# Patient Record
Sex: FEMALE | Race: WHITE | NOT HISPANIC OR LATINO | URBAN - METROPOLITAN AREA
[De-identification: names, ages, dates, MRNs, and addresses within clinical notes are randomized per-mention and may not be internally consistent; named-entity substitution may affect disease eponyms.]

---

## 2021-04-08 DIAGNOSIS — Z23 ENCOUNTER FOR IMMUNIZATION: ICD-10-CM

## 2024-10-02 ENCOUNTER — OFFICE VISIT (OUTPATIENT)
Dept: URGENT CARE | Facility: CLINIC | Age: 54
End: 2024-10-02
Payer: COMMERCIAL

## 2024-10-02 ENCOUNTER — APPOINTMENT (OUTPATIENT)
Dept: RADIOLOGY | Facility: CLINIC | Age: 54
End: 2024-10-02
Payer: COMMERCIAL

## 2024-10-02 VITALS
TEMPERATURE: 97.5 F | BODY MASS INDEX: 45.82 KG/M2 | WEIGHT: 275 LBS | OXYGEN SATURATION: 96 % | DIASTOLIC BLOOD PRESSURE: 82 MMHG | HEART RATE: 79 BPM | RESPIRATION RATE: 16 BRPM | SYSTOLIC BLOOD PRESSURE: 134 MMHG | HEIGHT: 65 IN

## 2024-10-02 DIAGNOSIS — S63.641A SPRAIN OF METACARPOPHALANGEAL (MCP) JOINT OF RIGHT THUMB, INITIAL ENCOUNTER: Primary | ICD-10-CM

## 2024-10-02 DIAGNOSIS — M79.641 PAIN OF RIGHT HAND: ICD-10-CM

## 2024-10-02 PROCEDURE — 99203 OFFICE O/P NEW LOW 30 MIN: CPT

## 2024-10-02 PROCEDURE — 73130 X-RAY EXAM OF HAND: CPT

## 2024-10-02 RX ORDER — INSULIN ASPART 100 [IU]/ML
INJECTION, SOLUTION INTRAVENOUS; SUBCUTANEOUS
COMMUNITY
Start: 2024-07-15

## 2024-10-02 RX ORDER — ROSUVASTATIN CALCIUM 20 MG/1
20 TABLET, COATED ORAL DAILY
COMMUNITY

## 2024-10-02 RX ORDER — ANASTROZOLE 1 MG/1
1 TABLET ORAL DAILY
COMMUNITY
Start: 2024-02-12 | End: 2025-02-11

## 2024-10-02 RX ORDER — VALSARTAN 40 MG/1
40 TABLET ORAL DAILY
COMMUNITY
Start: 2024-07-12

## 2024-10-02 NOTE — PATIENT INSTRUCTIONS
Your x-rays were read by the provider. A radiologist will also read the x-rays and you will be notified of any abnormalities.     Wear the brace until you get xray results. If the final xray shows a fracture, keep the brace on until you follow-up with orthopedics. If the xray shows no fracture, you can use the brace as needed. Take off every 1-2 hours and perform gentle range of motion to prevent stiffness. You can also take it off to sleep and shower if there is no fracture.      Possible fracture in right 1st metacarpal bone. Follow-up with ortho - referral placed.    Continue elevation and ice applications.    Tylenol / Motrin for pain relief.    Go to the ED for any worsening symptoms.

## 2024-10-02 NOTE — PROGRESS NOTES
"  [facility]        NAME: Sandra Unger is a 54 y.o. female  : 1970    MRN: [id number]  DATE: 2024  TIME: 2:11 PM    Assessment and Plan   Sprain of metacarpophalangeal (MCP) joint of right thumb, initial encounter [S63.641A]  1. Sprain of metacarpophalangeal (MCP) joint of right thumb, initial encounter  Ambulatory Referral to Orthopedic Surgery      2. Pain of right hand  XR hand 3+ vw right        Possible 1st metacarpal fracture, pending rad read.  Patient fit for and placed in prefab thumb spica brace by RN.    Patient Instructions     Your x-rays were read by the provider. A radiologist will also read the x-rays and you will be notified of any abnormalities.     Wear the brace until you get xray results. If the final xray shows a fracture, keep the brace on until you follow-up with orthopedics. If the xray shows no fracture, you can use the brace as needed. Take off every 1-2 hours and perform gentle range of motion to prevent stiffness. You can also take it off to sleep and shower if there is no fracture.      Possible fracture in right 1st metacarpal bone. Follow-up with ortho - referral placed.    Continue elevation and ice applications.    Tylenol / Motrin for pain relief.    Go to the ED for any worsening symptoms.     If tests are performed, our office will contact you with results only if changes need to made to the care plan discussed with you at the visit. [facility]      Chief Complaint     Chief Complaint   Patient presents with    Finger Pain     Pt reports yesterday she was leaning on her right hand and she fell forward when her dog jumped near her. She reports leaning on the right thumb & \"heard a crunch\". C/o right thumb pain radiating into her wrist with movement. Applied ice/took ibuprofen.          History of Present Illness       54-year-old female who presents for evaluation of right thumb injury. Patient states she was " "leaning on her sofa with her right hand which got jammed into the sofa when her dog jumped on her. States she heard a \"crunch.\" Developed pain, swelling, and bruising which worsened today. Reports limited range of motion due to same. No numbness or tingling. Applied ice and took NSAID. Reports prior history of right thumb fracture from softball injury years ago.        Review of Systems   Review of Systems   Constitutional:  Negative for fever.   Respiratory:  Negative for shortness of breath.    Cardiovascular:  Negative for chest pain.   Gastrointestinal:  Negative for abdominal pain.   Musculoskeletal:  Positive for arthralgias (rght thumb / hand).   Skin:  Positive for color change (bruising). Negative for wound.   Neurological:  Negative for headaches.         Current Medications       Current Outpatient Medications:     anastrozole (ARIMIDEX) 1 mg tablet, Take 1 mg by mouth daily, Disp: , Rfl:     NovoLOG 100 UNIT/ML injection, INJECT 90 UNITS DAILY UNDER THE SKIN VIA INSULIN PUMP, Disp: , Rfl:     rosuvastatin (CRESTOR) 20 MG tablet, Take 20 mg by mouth daily, Disp: , Rfl:     valsartan (DIOVAN) 40 mg tablet, Take 40 mg by mouth daily, Disp: , Rfl:     Current Allergies     Allergies as of 10/02/2024 - Reviewed 10/02/2024   Allergen Reaction Noted    Codeine Anaphylaxis and Other (See Comments) 08/08/2005    Liraglutide Rash 08/15/2017            The following portions of the patient's history were reviewed and updated as appropriate: allergies, current medications, past family history, past medical history, past social history, past surgical history and problem list.     Past Medical History:   Diagnosis Date    Breast cancer (HCC)     DM (diabetes mellitus) (HCC)     High cholesterol     HTN (hypertension)        Past Surgical History:   Procedure Laterality Date    BREAST LUMPECTOMY Left     CHOLECYSTECTOMY      SMALL INTESTINE SURGERY      surgery as an infant    TONSILLECTOMY         History reviewed. No " "pertinent family history.      Medications have been verified.        Objective   /82   Pulse 79   Temp 97.5 °F (36.4 °C)   Resp 16   Ht 5' 5\" (1.651 m)   Wt 125 kg (275 lb)   SpO2 96%   BMI 45.76 kg/m²        Physical Exam     Physical Exam  Vitals and nursing note reviewed.   Constitutional:       General: She is not in acute distress.  HENT:      Head: Normocephalic and atraumatic.      Mouth/Throat:      Mouth: Mucous membranes are moist.   Cardiovascular:      Rate and Rhythm: Normal rate.   Pulmonary:      Effort: Pulmonary effort is normal.   Musculoskeletal:      Right forearm: Normal.      Right wrist: Normal. No swelling or tenderness. Normal range of motion. Normal pulse.      Cervical back: Normal range of motion and neck supple.      Comments: Swelling and ecchymosis to R thumb. +TTP of thumb into first metacarpal. Decreased flexion due to pain and swelling. Can otherwise make a complete fist. Sensation intact. Cap refill <2 sec.   Skin:     General: Skin is warm and dry.      Capillary Refill: Capillary refill takes less than 2 seconds.   Neurological:      Mental Status: She is alert and oriented to person, place, and time.                   "

## 2025-03-12 ENCOUNTER — TELEPHONE (OUTPATIENT)
Age: 55
End: 2025-03-12

## 2025-03-12 ENCOUNTER — PREP FOR PROCEDURE (OUTPATIENT)
Age: 55
End: 2025-03-12

## 2025-03-12 DIAGNOSIS — Z12.11 SCREENING FOR COLON CANCER: Primary | ICD-10-CM

## 2025-03-12 NOTE — TELEPHONE ENCOUNTER
"03/12/25  Screened by: Ken Soriano    Referring Provider Dr. Shoenberger    Pre- Screening:     There is no height or weight on file to calculate BMI.  H 5' 6\" Wt 264 lbs  BMI 42.6    Has patient been referred for a routine screening Colonoscopy? yes  Is the patient between 45-75 years old? yes      Previous Colonoscopy yes   If yes:    Date: 9 yrs ago     Facility:     Reason:       Does the patient want to see a Gastroenterologist prior to their procedure OR are they having any GI symptoms? no    Has the patient been hospitalized or had abdominal surgery in the past 6 months? no    Does the patient use supplemental oxygen? no    Does the patient take Coumadin, Lovenox, Plavix, Elliquis, Xarelto, or other blood thinning medication? no    Has the patient had a stroke, cardiac event, or stent placed in the past year? no    If patient is between 45yrs - 49yrs, please advise patient that we will have to confirm benefits & coverage with their insurance company for a routine screening colonoscopy.    "

## 2025-03-12 NOTE — TELEPHONE ENCOUNTER
Scheduled date of colonoscopy (as of today): 4/8/25  Physician performing colonoscopy: Dr. Tilley  Location of colonoscopy: [facility]  Bowel prep reviewed with patient: miralax/dulcolax prep, diabetic instructions, screening vs diagnostic sent via email   Instructions reviewed with patient by: N/A  Clearances: N/A    Yuliya from Dr. Shoenberger office called and scheduled pts colonoscopy.     Updated the insurance info to [employer]

## 2025-04-08 ENCOUNTER — HOSPITAL ENCOUNTER (OUTPATIENT)
Dept: GASTROENTEROLOGY | Facility: HOSPITAL | Age: 55
Setting detail: OUTPATIENT SURGERY
Discharge: HOME | End: 2025-04-08
Attending: INTERNAL MEDICINE
Payer: COMMERCIAL

## 2025-04-08 ENCOUNTER — ANESTHESIA (OUTPATIENT)
Dept: GASTROENTEROLOGY | Facility: HOSPITAL | Age: 55
End: 2025-04-08
Payer: COMMERCIAL

## 2025-04-08 ENCOUNTER — ANESTHESIA EVENT (OUTPATIENT)
Dept: GASTROENTEROLOGY | Facility: HOSPITAL | Age: 55
End: 2025-04-08
Payer: COMMERCIAL

## 2025-04-08 VITALS
OXYGEN SATURATION: 96 % | SYSTOLIC BLOOD PRESSURE: 122 MMHG | RESPIRATION RATE: 18 BRPM | DIASTOLIC BLOOD PRESSURE: 68 MMHG | TEMPERATURE: 97.3 F | HEART RATE: 73 BPM

## 2025-04-08 DIAGNOSIS — Z12.11 SCREENING FOR COLON CANCER: ICD-10-CM

## 2025-04-08 DIAGNOSIS — Z86.0100 PERSONAL HISTORY OF COLON POLYPS, UNSPECIFIED: ICD-10-CM

## 2025-04-08 PROCEDURE — G0121 COLON CA SCRN NOT HI RSK IND: HCPCS | Performed by: INTERNAL MEDICINE

## 2025-04-08 RX ORDER — CETIRIZINE HYDROCHLORIDE 5 MG/1
5 TABLET, CHEWABLE ORAL DAILY
COMMUNITY

## 2025-04-08 RX ORDER — PROPOFOL 10 MG/ML
INJECTION, EMULSION INTRAVENOUS AS NEEDED
Status: DISCONTINUED | OUTPATIENT
Start: 2025-04-08 | End: 2025-04-08

## 2025-04-08 RX ORDER — SODIUM CHLORIDE 9 MG/ML
100 INJECTION, SOLUTION INTRAVENOUS CONTINUOUS
Status: CANCELLED | OUTPATIENT
Start: 2025-04-08

## 2025-04-08 RX ORDER — KETAMINE HCL IN NACL, ISO-OSM 100MG/10ML
SYRINGE (ML) INJECTION AS NEEDED
Status: DISCONTINUED | OUTPATIENT
Start: 2025-04-08 | End: 2025-04-08

## 2025-04-08 RX ORDER — LIDOCAINE HYDROCHLORIDE 20 MG/ML
INJECTION, SOLUTION EPIDURAL; INFILTRATION; INTRACAUDAL; PERINEURAL AS NEEDED
Status: DISCONTINUED | OUTPATIENT
Start: 2025-04-08 | End: 2025-04-08

## 2025-04-08 RX ORDER — SODIUM CHLORIDE 9 MG/ML
INJECTION, SOLUTION INTRAVENOUS CONTINUOUS PRN
Status: DISCONTINUED | OUTPATIENT
Start: 2025-04-08 | End: 2025-04-08

## 2025-04-08 RX ADMIN — PROPOFOL 25 MG: 10 INJECTION, EMULSION INTRAVENOUS at 12:21

## 2025-04-08 RX ADMIN — PROPOFOL 25 MG: 10 INJECTION, EMULSION INTRAVENOUS at 12:30

## 2025-04-08 RX ADMIN — PROPOFOL 25 MG: 10 INJECTION, EMULSION INTRAVENOUS at 12:26

## 2025-04-08 RX ADMIN — PROPOFOL 75 MG: 10 INJECTION, EMULSION INTRAVENOUS at 12:19

## 2025-04-08 RX ADMIN — PROPOFOL 25 MG: 10 INJECTION, EMULSION INTRAVENOUS at 12:29

## 2025-04-08 RX ADMIN — PROPOFOL 25 MG: 10 INJECTION, EMULSION INTRAVENOUS at 12:27

## 2025-04-08 RX ADMIN — SODIUM CHLORIDE: 0.9 INJECTION, SOLUTION INTRAVENOUS at 12:08

## 2025-04-08 RX ADMIN — LIDOCAINE HYDROCHLORIDE 100 MG: 20 INJECTION, SOLUTION EPIDURAL; INFILTRATION; INTRACAUDAL; PERINEURAL at 12:19

## 2025-04-08 RX ADMIN — Medication 15 MG: at 12:19

## 2025-04-08 RX ADMIN — PROPOFOL 25 MG: 10 INJECTION, EMULSION INTRAVENOUS at 12:25

## 2025-04-08 RX ADMIN — PROPOFOL 25 MG: 10 INJECTION, EMULSION INTRAVENOUS at 12:23

## 2025-04-08 NOTE — ANESTHESIA PREPROCEDURE EVALUATION
Procedure:  COLONOSCOPY    Relevant Problems   ANESTHESIA (within normal limits)      CARDIO  Normal echo 2021   (+) High cholesterol   (+) Hypertension      GYN   (+) Breast cancer (HCC)      NEURO/PSYCH   (+) Depression      PULMONARY  Nonsmoker    (-) Sleep apnea   (-) URI (upper respiratory infection)      Endocrine   (+) DM (diabetes mellitus) (HCC) (BG monitor/insulin pump in situ  )    BMI 45    Physical Exam    Airway    Mallampati score: I  TM Distance: >3 FB  Neck ROM: full     Dental   No notable dental hx     Cardiovascular      Pulmonary      Other Findings  post-pubertal.    Lab Results   Component Value Date    SODIUM 139 12/02/2024    K 4.8 12/02/2024    BUN 9 12/02/2024    CREATININE 0.63 12/02/2024    EGFR 104 12/02/2024     Lab Results   Component Value Date    HGBA1C 9.2 (H) 11/20/2024     Anesthesia Plan  ASA Score- 2     Anesthesia Type- IV sedation with anesthesia with ASA Monitors.         Additional Monitors:     Airway Plan:            Plan Factors-Exercise tolerance (METS): >4 METS.    Chart reviewed.   Existing labs reviewed. Patient summary reviewed.    Patient is not a current smoker.              Induction- intravenous.    Postoperative Plan-         Informed Consent- Anesthetic plan and risks discussed with patient.  I personally reviewed this patient with the CRNA. Discussed and agreed on the Anesthesia Plan with the CRNA..      NPO Status:  Vitals Value Taken Time   Date of last liquid 04/08/25 04/08/25 1108   Time of last liquid 0630 04/08/25 1108   Date of last solid 04/06/25 04/08/25 1108   Time of last solid 2200 04/08/25 1108

## 2025-04-08 NOTE — H&P
History and Physical - [facility]  Sandra Unger 54 y.o. female MRN: [id number]    HPI: Sandra Unger is a 54 y.o. year old female who presents for colonoscopy secondary to personal history of polyps    REVIEW OF SYSTEMS: Per the HPI, and otherwise unremarkable.    Historical Information   Past Medical History:   Diagnosis Date    Breast cancer (HCC)     Depression     Diabetes mellitus (HCC)     DM (diabetes mellitus) (HCC)     High cholesterol     HTN (hypertension)     Hypertension      Past Surgical History:   Procedure Laterality Date    BREAST LUMPECTOMY Left     CHOLECYSTECTOMY      SMALL INTESTINE SURGERY      surgery as an infant    TONSILLECTOMY      US BREAST CLIP NEEDLE LOC LEFT Left 2024     Social History   Social History     Substance and Sexual Activity   Alcohol Use None     Social History     Substance and Sexual Activity   Drug Use Never     Social History     Tobacco Use   Smoking Status Never   Smokeless Tobacco Never     Family History   Problem Relation Age of Onset    Cancer Mother     Cancer Father        Meds/Allergies       Current Outpatient Medications:     anastrozole (ARIMIDEX) 1 mg tablet    cetirizine (ZyrTEC) 5 MG chewable tablet    rosuvastatin (CRESTOR) 20 MG tablet    valsartan (DIOVAN) 40 mg tablet    NovoLOG 100 UNIT/ML injection    Allergies   Allergen Reactions    Codeine Anaphylaxis and Other (See Comments)     ANAPHYLAXIS \T\ ANGIOEDEMA moshe perc-itchy    Liraglutide Rash       Objective     /67   Pulse 90   Temp (!) 97.3 °F (36.3 °C) (Temporal)   Resp 20   SpO2 95%     PHYSICAL EXAM    Gen: NAD AAOx3  Head: Normocephalic, Atraumatic  CV: S1S2 RRR no m/r/g  CHEST: Clear b/l no c/r/w  ABD: soft, +BS NT/ND  EXT: no edema    ASSESSMENT/PLAN:  This is a 54 y.o. year old female here for colonoscopy secondary to personal history of polyps, and she is stable and optimized for her procedure.

## 2025-04-08 NOTE — ANESTHESIA POSTPROCEDURE EVALUATION
----- Message from Sarah Vergara PA-C sent at 3/8/2021 10:29 AM CST -----  Please inform patient of CT scan result. No specific finding of diverticulitis. There is mild colonic diverticulosis. She should be taking MiraLax once daily. Has pain improved? Post-Op Assessment Note    CV Status:  Stable    Pain management: adequate       Mental Status:  Lethargic and sleepy   Hydration Status:  Stable   PONV Controlled:  None   Airway Patency:  Patent     Post Op Vitals Reviewed: Yes    No anethesia notable event occurred.    Staff: CRNA           Last Filed PACU Vitals:  Vitals Value Taken Time   Temp     Pulse     BP     Resp     SpO2

## 2025-05-19 ENCOUNTER — OFFICE VISIT (OUTPATIENT)
Dept: URGENT CARE | Facility: CLINIC | Age: 55
End: 2025-05-19
Payer: COMMERCIAL

## 2025-05-19 ENCOUNTER — APPOINTMENT (OUTPATIENT)
Dept: RADIOLOGY | Facility: CLINIC | Age: 55
End: 2025-05-19
Payer: COMMERCIAL

## 2025-05-19 VITALS — HEART RATE: 86 BPM | TEMPERATURE: 98.7 F | RESPIRATION RATE: 18 BRPM | OXYGEN SATURATION: 99 %

## 2025-05-19 DIAGNOSIS — M25.561 ACUTE PAIN OF RIGHT KNEE: Primary | ICD-10-CM

## 2025-05-19 DIAGNOSIS — M25.561 ACUTE PAIN OF RIGHT KNEE: ICD-10-CM

## 2025-05-19 PROCEDURE — 99213 OFFICE O/P EST LOW 20 MIN: CPT

## 2025-05-19 PROCEDURE — 73562 X-RAY EXAM OF KNEE 3: CPT

## 2025-05-19 NOTE — PATIENT INSTRUCTIONS
Patient Instructions   The final xray result will appear in your mychart;  Use Voltaren gel as prescribed  Ice as needed.  Acetaminophen and/or ibuprofen for pain and inflammation.  Follow-up with orthopedics.  PCP follow-up in 3-5 days  Proceed to the ER if symptoms worsen.      If tests have been performed at [facility], our office will contact you with results if changes need to be made to the care plan discussed with you at the visit.  You can review your full results on [facility].

## 2025-05-19 NOTE — PROGRESS NOTES
[facility]        NAME: Sandra Unger is a 54 y.o. female  : 1970    MRN: [id number]  DATE: May 19, 2025  TIME: 7:22 PM    Assessment and Plan   Acute pain of right knee [M25.561]  1. Acute pain of right knee  XR knee 3 vw right non injury    Diclofenac Sodium (VOLTAREN) 1 %    Ambulatory Referral to Orthopedic Surgery            Patient Instructions   The final xray result will appear in your mychart;  Use Voltaren gel as prescribed  Ice as needed.  Acetaminophen and/or ibuprofen for pain and inflammation.  Follow-up with orthopedics.  PCP follow-up in 3-5 days  Proceed to the ER if symptoms worsen.      If tests have been performed at [facility], our office will contact you with results if changes need to be made to the care plan discussed with you at the visit.  You can review your full results on [facility].    Chief Complaint     Chief Complaint   Patient presents with    Knee Pain     Started 3 days ago with right knee pain, denies specific injury, reports pain was worse today while walking on an incline, reports intermittent radiating pain down leg         History of Present Illness       55 y/o F with PMHx of HTN and DM, presents for right knee pain x 3 days.  Patient notes she developed atraumatic knee pain at that time.  Today patient was walking up a incline, when she felt a pop in the back of her knee.  Has been using Advil without relief.  Also use crutches        Review of Systems   Review of Systems   Constitutional:  Negative for chills and fever.   Musculoskeletal:  Positive for gait problem. Negative for joint swelling.   Skin:  Negative for color change.         Current Medications     Current Medications[1]    Current Allergies     Allergies as of 2025 - Reviewed 2025   Allergen Reaction Noted    Codeine Anaphylaxis and Other (See Comments) 2005    Liraglutide Rash 08/15/2017            The following portions of the patient's history were reviewed and  updated as appropriate: allergies, current medications, past family history, past medical history, past social history, past surgical history and problem list.     Past Medical History:   Diagnosis Date    Breast cancer (HCC)     Depression     Diabetes mellitus (HCC)     DM (diabetes mellitus) (HCC)     High cholesterol     HTN (hypertension)     Hypertension        Past Surgical History:   Procedure Laterality Date    BREAST LUMPECTOMY Left     CHOLECYSTECTOMY      SMALL INTESTINE SURGERY      surgery as an infant    TONSILLECTOMY      US BREAST CLIP NEEDLE LOC LEFT Left 2024       Family History   Problem Relation Age of Onset    Cancer Mother     Cancer Father          Medications have been verified.        Objective   Pulse 86   Temp 98.7 °F (37.1 °C) (Tympanic)   Resp 18   SpO2 99%   No LMP recorded. Patient is postmenopausal.       Physical Exam     Physical Exam  Vitals and nursing note reviewed.   Constitutional:       General: She is not in acute distress.     Appearance: She is not toxic-appearing.   HENT:      Head: Normocephalic and atraumatic.     Eyes:      Conjunctiva/sclera: Conjunctivae normal.     Pulmonary:      Effort: Pulmonary effort is normal.     Musculoskeletal:      Comments: Tenderness palpation on posterior lateral aspect of calf  Range of motion of ankle, knee within normal limits, with subjective discomfort with plantarflexion and full extension     Neurological:      Mental Status: She is alert.     Psychiatric:         Mood and Affect: Mood normal.         Behavior: Behavior normal.                        [1]   Current Outpatient Medications:     Diclofenac Sodium (VOLTAREN) 1 %, Apply 2 g topically 4 (four) times a day, Disp: 100 g, Rfl: 0    anastrozole (ARIMIDEX) 1 mg tablet, Take 1 mg by mouth daily, Disp: , Rfl:     cetirizine (ZyrTEC) 5 MG chewable tablet, Chew 5 mg daily, Disp: , Rfl:     NovoLOG 100 UNIT/ML injection, INJECT 90 UNITS DAILY UNDER THE SKIN VIA INSULIN PUMP,  Disp: , Rfl:     rosuvastatin (CRESTOR) 20 MG tablet, Take 20 mg by mouth daily, Disp: , Rfl:     valsartan (DIOVAN) 40 mg tablet, Take 40 mg by mouth daily, Disp: , Rfl:

## 2025-06-05 ENCOUNTER — OFFICE VISIT (OUTPATIENT)
Dept: OBGYN CLINIC | Facility: CLINIC | Age: 55
End: 2025-06-05
Payer: COMMERCIAL

## 2025-06-05 VITALS — HEIGHT: 65 IN | BODY MASS INDEX: 44.65 KG/M2 | WEIGHT: 268 LBS

## 2025-06-05 DIAGNOSIS — M17.11 PRIMARY OSTEOARTHRITIS OF RIGHT KNEE: Primary | ICD-10-CM

## 2025-06-05 PROCEDURE — 99203 OFFICE O/P NEW LOW 30 MIN: CPT | Performed by: STUDENT IN AN ORGANIZED HEALTH CARE EDUCATION/TRAINING PROGRAM

## 2025-06-05 RX ORDER — IBUPROFEN 200 MG
600 TABLET ORAL EVERY 6 HOURS PRN
COMMUNITY

## 2025-06-05 RX ORDER — CELECOXIB 100 MG/1
100 CAPSULE ORAL 2 TIMES DAILY
Qty: 60 CAPSULE | Refills: 1 | Status: SHIPPED | OUTPATIENT
Start: 2025-06-05

## 2025-06-05 NOTE — PROGRESS NOTES
ORTHO CARE [facility] ORTHOPEDIC CARE SPECIALISTS [place]  [address]  [facility] [address]  [facility] [address] [phone]       Sandra Unger  [id number]  1970    ORTHOPAEDIC SURGERY OUTPATIENT NOTE  6/5/2025    Assessment & Plan  Primary osteoarthritis of right knee  X-rays reviewed with patient at today's visit.  I explained to her that the pain she is experiencing is most likely result of osteoarthritis.  I believe as a result of this osteoarthritis, she is developing patellofemoral pain.  I discussed nonsurgical interventions such as injection therapy, bracing, and physical therapy.  Patient wishes to proceed with a short hinged knee brace as well as physical therapy.  All the orders were placed at today's visit.  In the event that these interventions do not provide any relief, I also placed an order for a Durolane Visco injection that she can schedule at a later date.  I also provided her a prescription of Celebrex 100 mg to take twice daily with food.  I remind her that she should not take any other anti-inflammatory medication with this.  Tylenol is safe.  No formal restrictions for activities, but use pain as a guide.    Orders:    Ambulatory Referral to Physical Therapy; Future    Durable Medical Equipment    celecoxib (CeleBREX) 100 mg capsule; Take 1 capsule (100 mg total) by mouth 2 (two) times a day    Injection Procedure Prior Authorization; Future        Follow-up:  Return if symptoms worsen or fail to improve.    The patient's diagnosis and treatment were discussed at length today. We discussed no treatment, non-operative treatment, and operative treatment.    Procedures  No procedures today.      HISTORY:  55 y.o. female  whose occupation is unknown, referred to me by urgent care, seen in clinic for consultation of right knee pain.  She states that on 5/19/2025 she was hiking and felt a sharp pain in the back of her knee with swelling.  She states that she then went to an  "urgent care where she obtained an x-ray that demonstrated tricompartmental osteoarthritis.  Since then, she states that the swelling has gone down, as well as no pain in the back the knee.  She denies any clicking or catching in the knee.  She does note some instances of instability.  She states pain is increased with steps and prolonged activity.  She states that she currently takes 600 mg of Advil at night.  She denies any numbness or tingling at this time.    Surgical History:  None    Previous Injection(s): none      The following portions of the patient's history were reviewed and updated as appropriate: allergies, current medications, past family history, past social history, past surgical history and problem list.    Ht 5' 5\" (1.651 m)   Wt 122 kg (268 lb)   BMI 44.60 kg/m²    Lab Results   Component Value Date    HGBA1C 9.2 (H) 11/20/2024         Past Medical History[1]    Past Surgical History[2]    Social History     Socioeconomic History    Marital status: /Civil Union     Spouse name: Not on file    Number of children: Not on file    Years of education: Not on file    Highest education level: Not on file   Occupational History    Not on file   Tobacco Use    Smoking status: Never    Smokeless tobacco: Never   Substance and Sexual Activity    Alcohol use: Not on file    Drug use: Never    Sexual activity: Yes     Partners: Male   Other Topics Concern    Not on file   Social History Narrative    Not on file     Social Drivers of Health     Financial Resource Strain: Not At Risk (5/8/2025)    Received from [facility]    Financial Insecurity     In the last 12 months did you skip medications to save money?: No     In the last 12 months was there a time when you needed to see a doctor but could not because of cost?: No   Food Insecurity: No Food Insecurity (5/8/2025)    Received from [facility]    Food Insecurity     In the last 12 months did you ever eat less than " you felt you should because there wasn't enough money for food?: No   Transportation Needs: No Transportation Needs (5/8/2025)    Received from [facility]    Transportation Needs     In the last 12 months have you ever had to go without healthcare because you didn't have a way to get there?: No   Physical Activity: Not on file   Stress: Stress Concern Present (6/14/2023)    Received from [facility]    [employer] - Occupational Stress Questionnaire     Feeling of Stress : Very much   Social Connections: Socially Integrated (5/8/2025)    Received from [facility]    Social Connection     Do you often feel lonely?: No   Intimate Partner Violence: Not At Risk (11/20/2024)    Received from [facility]    Humiliation, Afraid, Rape, and Kick questionnaire     Within the last year, have you been afraid of your partner or ex-partner?: No     Within the last year, have you been humiliated or emotionally abused in other ways by your partner or ex-partner?: No     Within the last year, have you been kicked, hit, slapped, or otherwise physically hurt by your partner or ex-partner?: No     Within the last year, have you been raped or forced to have any kind of sexual activity by your partner or ex-partner?: No   Housing Stability: Not At Risk (5/8/2025)    Received from [facility]    Housing Stability     Are you worried that in the next 2 months you may not have stable housing?: No       Family History[3]     Patient's Medications   New Prescriptions    CELECOXIB (CELEBREX) 100 MG CAPSULE    Take 1 capsule (100 mg total) by mouth 2 (two) times a day   Previous Medications    ANASTROZOLE (ARIMIDEX) 1 MG TABLET    Take 1 mg by mouth daily    CETIRIZINE (ZYRTEC) 5 MG CHEWABLE TABLET    Chew 5 mg in the morning.    DICLOFENAC SODIUM (VOLTAREN) 1 %    Apply 2 g topically 4 (four) times a day    IBUPROFEN (MOTRIN) 200  MG TABLET    Take 600 mg by mouth every 6 (six) hours as needed for mild pain    NOVOLOG 100 UNIT/ML INJECTION        ROSUVASTATIN (CRESTOR) 20 MG TABLET    Take 20 mg by mouth in the morning.    VALSARTAN (DIOVAN) 40 MG TABLET    Take 40 mg by mouth in the morning.   Modified Medications    No medications on file   Discontinued Medications    No medications on file       Allergies[4]       REVIEW OF SYSTEMS:  Constitutional: Negative.    HEENT: Negative.    Respiratory: Negative.    Skin: Negative.    Neurological: Negative.    Psychiatric/Behavioral: Negative.  Musculoskeletal: Negative except for that mentioned in the HPI.    Gen: No acute distress, resting comfortably in bed  HEENT: Eyes clear, moist mucus membranes, hearing intact  Respiratory: No audible wheezing or stridor  Cardiovascular: Well Perfused peripherally, 2+ distal pulse  Abdomen: nondistended, no peritoneal signs     PHYSICAL EXAM:      Right Knee Exam  Alignment:  Normal knee alignment.  Inspection:  No swelling. No edema. No erythema. No ecchymosis.  Palpation:  No tenderness.  ROM:  Knee Extension 0. Knee Flexion 120.  Strength:  5/5 quadriceps and hamstrings.  Stability:  No objective knee instability. Stable Varus / Valgus stress, Lachman, and Posterior drawer.  Tests:  (-) Himanshu.  Patella:  Patella tracks centrally with crepitus.  Neurovascular:  Sensation intact in DP/SP/Renae/Sa/T nerve distributions. Sensation intact L1-S1 BLE.  2+ DP & PT pulses.  Gait:  Smooth.    IMAGING:  I have personally reviewed pertinent films in PACS and/or [facility].  XR of right knee - mild to moderate tricompartmental degenerative changes to the knee, worse in the medial compartment with joint space narrowing and sclerotic changes.  No acute fractures or abnormalities noted.. I have reviewed the radiology report(s) and agree with their impression.      Electronic Medical Records were reviewed including Roshan Sr PA-C from urgent care    Rojelio  "Esther    Scribe Attestation      I,:  Rojelio Santana am acting as a scribe while in the presence of the attending physician.:       I,:  Alan Worthington, DO personally performed the services described in this documentation    as scribed in my presence.:               Portions of the record may have been created with voice recognition software.  Occasional wrong word or \"sound a like\" substitutions may have occurred due to the inherent limitations of voice recognition software.  Read the chart carefully and recognize, using context, where substitutions have occurred. All patient's questions were answered to their satisfaction.          [1]   Past Medical History:  Diagnosis Date    Breast cancer (HCC)     Depression     Diabetes mellitus (HCC)     DM (diabetes mellitus) (HCC)     High cholesterol     HTN (hypertension)     Hypertension    [2]   Past Surgical History:  Procedure Laterality Date    BREAST LUMPECTOMY Left     CHOLECYSTECTOMY      SMALL INTESTINE SURGERY      surgery as an infant    TONSILLECTOMY      US BREAST CLIP NEEDLE LOC LEFT Left 2024   [3]   Family History  Problem Relation Name Age of Onset    Cancer Mother Sandra Saldana     Cancer Father Coleman    [4]   Allergies  Allergen Reactions    Codeine Anaphylaxis and Other (See Comments)     ANAPHYLAXIS \T\ ANGIOEDEMA moshe perc-itchy    Liraglutide Rash     "

## 2025-06-05 NOTE — ASSESSMENT & PLAN NOTE
X-rays reviewed with patient at today's visit.  I explained to her that the pain she is experiencing is most likely result of osteoarthritis.  I believe as a result of this osteoarthritis, she is developing patellofemoral pain.  I discussed nonsurgical interventions such as injection therapy, bracing, and physical therapy.  Patient wishes to proceed with a short hinged knee brace as well as physical therapy.  All the orders were placed at today's visit.  In the event that these interventions do not provide any relief, I also placed an order for a Durolane Visco injection that she can schedule at a later date.  I also provided her a prescription of Celebrex 100 mg to take twice daily with food.  I remind her that she should not take any other anti-inflammatory medication with this.  Tylenol is safe.  No formal restrictions for activities, but use pain as a guide.    Orders:    Ambulatory Referral to Physical Therapy; Future    Durable Medical Equipment    celecoxib (CeleBREX) 100 mg capsule; Take 1 capsule (100 mg total) by mouth 2 (two) times a day    Injection Procedure Prior Authorization; Future

## 2025-06-12 ENCOUNTER — PROCEDURE VISIT (OUTPATIENT)
Dept: OBGYN CLINIC | Facility: CLINIC | Age: 55
End: 2025-06-12
Payer: COMMERCIAL

## 2025-06-12 VITALS — WEIGHT: 268 LBS | HEIGHT: 65 IN | BODY MASS INDEX: 44.65 KG/M2

## 2025-06-12 DIAGNOSIS — M17.11 PRIMARY OSTEOARTHRITIS OF RIGHT KNEE: Primary | ICD-10-CM

## 2025-06-12 PROCEDURE — 20610 DRAIN/INJ JOINT/BURSA W/O US: CPT

## 2025-06-12 NOTE — ASSESSMENT & PLAN NOTE
Patient is present for visco injection which was given without complication and was well tolerated  Discussed that she should avoid strenuous lower body exercises for first couple days   OTC pan medication as needed  Discussed that injection can be repeated every 6 months  Follow up as needed

## 2025-06-12 NOTE — PROGRESS NOTES
ORTHO CARE [facility] ORTHOPEDIC CARE SPECIALISTS [place]  [address]  [facility] [address]  [place] [address]  [phone]       Sandra Unger  [id number]  1970    ORTHOPAEDIC SURGERY OUTPATIENT NOTE  6/12/2025  :  Assessment & Plan  Primary osteoarthritis of right knee  Patient is present for visco injection which was given without complication and was well tolerated  Discussed that she should avoid strenuous lower body exercises for first couple days   OTC pan medication as needed  Discussed that injection can be repeated every 6 months  Follow up as needed        Large joint arthrocentesis: R knee    Performed by: Yuliya Pollard PA-C  Authorized by: Alan Worthington DO    Universal Protocol:  Consent: Verbal consent obtained  Risks and benefits: risks, benefits and alternatives were discussed  Consent given by: patient  Timeout called at: 6/12/2025 11:34 AM.  Patient understanding: patient states understanding of the procedure being performed  Site marked: the operative site was marked  Patient identity confirmed: verbally with patient  Supporting Documentation  Indications: pain     Is this a Visco injection? Yes  Non-Pharmacologic Treatments Attempted: Physical Therapy  Pharmacologic Treatments Attempted: Otc pain meds  Pain Score: 0Procedure Details  Location: knee - R knee  Needle size: 20 G  Ultrasound guidance: no  Approach: anterolateral  Medications administered: 48 mg hylan 48 MG/6ML    Patient tolerance: patient tolerated the procedure well with no immediate complications  Dressing:  Sterile dressing applied            Patient is here for her right knee Synvisc One Injection.     Patient rates their pain as 4/10 today along the diffuse.    Pain is worse with going up and down stairs and squatting    Physical exam of the knee shows no effusion no ecchymosis.    Patient has attempted physical therapy and cortisone outpatient with minimal improvement of their symptoms.    Patient  tolerated the procedure well and all questions concerns were answered.

## 2025-06-26 ENCOUNTER — EVALUATION (OUTPATIENT)
Dept: PHYSICAL THERAPY | Facility: CLINIC | Age: 55
End: 2025-06-26
Attending: STUDENT IN AN ORGANIZED HEALTH CARE EDUCATION/TRAINING PROGRAM
Payer: COMMERCIAL

## 2025-06-26 DIAGNOSIS — M17.11 PRIMARY OSTEOARTHRITIS OF RIGHT KNEE: Primary | ICD-10-CM

## 2025-06-26 PROCEDURE — 97161 PT EVAL LOW COMPLEX 20 MIN: CPT | Performed by: PHYSICAL THERAPIST

## 2025-06-26 NOTE — PROGRESS NOTES
"PT Evaluation     Today's date: 2025  Patient name: Sandra Unger  : 1970  MRN: [id number]  Referring provider: Alan Worthington DO  Dx:   Encounter Diagnosis     ICD-10-CM    1. Primary osteoarthritis of right knee  M17.11 Ambulatory Referral to Physical Therapy                     Assessment    Assessment details: Pt is a 55 y.o. female who presents to outpatient PT with pain, decreased rom, decreased strength and decreased functional mobility consistent with knee OA. She will benefit from skilled PT to address these deficits in order to achieve her goals and maximize her functional mobility.           Goals  Short Term Goals:   Independent performance of initial hep  Decrease pain 2 points on VAS      Long Term Goals:  Independent performance of comprehensive hep  Work performance is returned to max level of function  Performance of IADL's is returned to max level of function  Performance in recreational activities is improved to max level of function  Able to walk community distance with min pain  Able to ascend basements stairs with reciprocal gait pattern and single rail.    Plan    Planned therapy interventions: joint mobilization, manual therapy, massage, strengthening, stretching, therapeutic activities, therapeutic exercise, therapeutic training, home exercise program and IADL retraining    Frequency: 2x week        Subjective Evaluation    History of Present Illness  Mechanism of injury: Pt reports that she has been experiencing off an on R knee for 6 months but has been gradually getting worse. Underwent an injection on 25 with good pain reduction but reports that posterior knee pain returned yesterday when she was walking her dog.  X-ray revealed OA per patient reports.  Reports occasional episodes of instability especially when walking up her basement stairs, requires a railing \"to pull myself up\".  Reports that she will occasionally notice edema.  Reports that she would like to " return to walking her young dog like she used.    She was Rx anti-inflammatories and night to help her sleep and to reduce morning pain.     Hx of L plantar fasciitis.     Patient Goals  Patient goals for therapy: decreased edema, decreased pain, increased motion, increased strength, independence with ADLs/IADLs and return to sport/leisure activities    Pain  Current pain ratin  At best pain ratin  At worst pain ratin      Objective  R knee      ROM   Flexion: 105 pain at end range   Extension: -8 pain at end range    Strength:   Flexion:  4/5   Extension:  4/5    TTP anterior and medial anterior line and over VMO  Crepitus noted with arom  Patella misael  Antalgic gait pattern including decreased stance time on L and inability to achieve TKE  Lachman's: neg  Posterior Drawer: neg  Valgus Stress Test: neg  Varus Stress Test: neg  Single Leg Squat:  unable secondary to pain               Precautions: OA      manual             Pa, ap mobs             laser 4'            Patella rom                          Neuro Re-Ed                                                                                                        Ther Ex             Heel slides             Gastroc towel stretch with quad set             Quad set             SLR  flexion             Slr abduction             Prone TKE             Cone taps             bike             Ther Activity                                       Gait Training                                       Modalities

## 2025-06-26 NOTE — HOME EXERCISE EDUCATION
Program_ID:[id number]   Access Code: [email or web address]  URL: [email or web address]  Date: 06-  Prepared By: Valente Potts    Program Notes    KEEP ALL EXERCISES IN A COMFORTABLE RANGE OF MOTIONDO NOT FORCE INTO PAIN  Exercises      - Supine Heel Slide with Strap - 2-3 x daily - 7 x weekly -  sets - 20 reps - 5 seconds hold      - Long Sitting Calf Stretch with Strap - 2-3 x daily - 7 x weekly -  sets - 4 reps - 30 seconds hold      - Supine Quad Set - 2-3 x daily - 7 x weekly -  sets - 10 reps - 5 seconds hold      - Supine Straight Leg Raises - 1 x daily - 7 x weekly -  sets - 20 reps

## 2025-06-30 ENCOUNTER — APPOINTMENT (OUTPATIENT)
Dept: PHYSICAL THERAPY | Facility: CLINIC | Age: 55
End: 2025-06-30
Attending: STUDENT IN AN ORGANIZED HEALTH CARE EDUCATION/TRAINING PROGRAM
Payer: COMMERCIAL

## 2025-07-01 ENCOUNTER — OFFICE VISIT (OUTPATIENT)
Dept: PHYSICAL THERAPY | Facility: CLINIC | Age: 55
End: 2025-07-01
Attending: STUDENT IN AN ORGANIZED HEALTH CARE EDUCATION/TRAINING PROGRAM
Payer: COMMERCIAL

## 2025-07-01 DIAGNOSIS — M17.11 PRIMARY OSTEOARTHRITIS OF RIGHT KNEE: Primary | ICD-10-CM

## 2025-07-01 PROCEDURE — 97140 MANUAL THERAPY 1/> REGIONS: CPT

## 2025-07-01 PROCEDURE — 97110 THERAPEUTIC EXERCISES: CPT

## 2025-07-01 NOTE — PROGRESS NOTES
"Daily Note     Today's date: 2025  Patient name: Sandra Unger  : 1970  MRN: [id number]  Referring provider: Alan Worthington DO  Dx:   Encounter Diagnosis     ICD-10-CM    1. Primary osteoarthritis of right knee  M17.11           Start Time: 935  Stop Time: 1013  Total time in clinic (min): 38 minutes    Subjective: Patient reports no changes in symptoms since initial evaluation.       Objective: See treatment diary below       Assessment: Patient tolerated introduction to exercises fairly well without any significant increase in pain. Benefited from verbal cues to perform quad set prior to SLR to encourage greater TKE. Noted pain in all directions with gentle patella glides, especially med/lat. Patient would benefit from continued PT to improve level of function.       Plan: Continue per POC. Increase reps/resistance as tolerated.      Precautions: OA      manual            Pa, ap mobs             laser 4' 16W 4'           Patella rom  MS                        Neuro Re-Ed                                                                                                        Ther Ex             Heel slides  2x10 5\"           Gastroc towel stretch with quad set  3x30\"           Quad set  W/ slr           SLR  flexion  x10           Slr abduction  2x10           Bridges  2x10           Prone TKE             Cone taps             Hip 3-way  15x ea           bike  L2x6'           Ther Activity                                       Gait Training                                       Modalities                                            "

## 2025-07-03 ENCOUNTER — APPOINTMENT (OUTPATIENT)
Dept: PHYSICAL THERAPY | Facility: CLINIC | Age: 55
End: 2025-07-03
Attending: STUDENT IN AN ORGANIZED HEALTH CARE EDUCATION/TRAINING PROGRAM
Payer: COMMERCIAL

## 2025-07-08 ENCOUNTER — OFFICE VISIT (OUTPATIENT)
Dept: PHYSICAL THERAPY | Facility: CLINIC | Age: 55
End: 2025-07-08
Attending: STUDENT IN AN ORGANIZED HEALTH CARE EDUCATION/TRAINING PROGRAM
Payer: COMMERCIAL

## 2025-07-08 DIAGNOSIS — M17.11 PRIMARY OSTEOARTHRITIS OF RIGHT KNEE: Primary | ICD-10-CM

## 2025-07-08 PROCEDURE — 97140 MANUAL THERAPY 1/> REGIONS: CPT | Performed by: PHYSICAL THERAPIST

## 2025-07-08 PROCEDURE — 97110 THERAPEUTIC EXERCISES: CPT | Performed by: PHYSICAL THERAPIST

## 2025-07-08 NOTE — PROGRESS NOTES
"Daily Note     Today's date: 2025  Patient name: Sandra Unger  : 1970  MRN: [id number]  Referring provider: Alan Worthington DO  Dx:   Encounter Diagnosis     ICD-10-CM    1. Primary osteoarthritis of right knee  M17.11                      Subjective: Patient reports compliance with her hep and that she is having no difficulty with it.       Objective: See treatment diary below       Assessment: good tolerance to manual tx with improved extension rom noted post-tx. Pt is initially comfortable during bridging but becomes more painful therefore reps are limited. Fatigues quickly with SLR.  No sig pain reported with other therex.     Plan: Continue per POC. Increase reps/resistance as tolerated.      Precautions: OA      manual           Pa, ap mobs   kl          laser 4' 16W 4' 4'          Patella rom  MS kl                       Neuro Re-Ed                                                                                                        Ther Ex             Heel slides  2x10 5\" 5\"x20          Gastroc towel stretch with quad set  3x30\"           Quad set  W/ slr           SLR  flexion  x10 x20          Slr abduction  2x10           Bridges  2x10 5\"x13          Prone TKE   5x20          Cone taps   x20          Hip 3-way  15x ea x20ea          bike  L2x6' L2x8          Hr/tr   With quad set x20ea          Ther Activity                                       Gait Training                                       Modalities                                            "

## 2025-07-11 ENCOUNTER — OFFICE VISIT (OUTPATIENT)
Dept: PHYSICAL THERAPY | Facility: CLINIC | Age: 55
End: 2025-07-11
Attending: STUDENT IN AN ORGANIZED HEALTH CARE EDUCATION/TRAINING PROGRAM
Payer: COMMERCIAL

## 2025-07-11 DIAGNOSIS — M17.11 PRIMARY OSTEOARTHRITIS OF RIGHT KNEE: Primary | ICD-10-CM

## 2025-07-11 PROCEDURE — 97140 MANUAL THERAPY 1/> REGIONS: CPT | Performed by: PHYSICAL THERAPIST

## 2025-07-11 PROCEDURE — 97110 THERAPEUTIC EXERCISES: CPT | Performed by: PHYSICAL THERAPIST

## 2025-07-11 NOTE — PROGRESS NOTES
"Daily Note     Today's date: 2025  Patient name: Sandra Unger  : 1970  MRN: [id number]  Referring provider: Alan Worthington DO  Dx:   Encounter Diagnosis     ICD-10-CM    1. Primary osteoarthritis of right knee  M17.11                      Subjective: Patient reports that she was sore for approx 24 hours after her LV but that it was tolerable.       Objective: See treatment diary below       Assessment:  progressed therex as listed with no complaints. Rom is improving nicely. Continue to monitor response and progress as moshe NV.     Plan: Continue per POC. Increase reps/resistance as tolerated.      Precautions: OA      manual          Pa, ap mobs   kl kl         laser 4' 16W 4' 4' 4'         Patella rom  MS kl kl                      Neuro Re-Ed                                                                                                        Ther Ex             Heel slides  2x10 5\" 5\"x20 5\"x20         Gastroc towel stretch with quad set  3x30\"           Quad set  W/ slr           SLR  flexion  x10 x20 x10         Slr abduction  2x10           Bridges  2x10 5\"x13          Prone TKE   5x20 5#5\"x20         Cone taps   x20 x20         Hip 3-way  15x ea x20ea x20         bike  L2x6' L2x8 L2 8'         Hr/tr   With quad set x20ea x20ea         Leg press    B/L 30#x30         Ther Activity                                       Gait Training                                       Modalities                                            "

## 2025-07-14 ENCOUNTER — OFFICE VISIT (OUTPATIENT)
Dept: PHYSICAL THERAPY | Facility: CLINIC | Age: 55
End: 2025-07-14
Attending: STUDENT IN AN ORGANIZED HEALTH CARE EDUCATION/TRAINING PROGRAM
Payer: COMMERCIAL

## 2025-07-14 DIAGNOSIS — M17.11 PRIMARY OSTEOARTHRITIS OF RIGHT KNEE: Primary | ICD-10-CM

## 2025-07-14 PROCEDURE — 97110 THERAPEUTIC EXERCISES: CPT

## 2025-07-14 NOTE — PROGRESS NOTES
"Daily Note     Today's date: 2025  Patient name: Sandra Unger  : 1970  MRN: [id number]  Referring provider: Alan Worthington DO  Dx:   Encounter Diagnosis     ICD-10-CM    1. Primary osteoarthritis of right knee  M17.11           Start Time: 1530  Stop Time: 1611  Total time in clinic (min): 41 minutes      Subjective: Patient states, \"I tweaked my knee a little bit walking my dog this morning.\"      Objective: See treatment diary below.      Assessment: Right knee is sore throughout performance of TE program.  Tenderness noted medial and lateral joint lines.      Plan: Continue treatment as per PT plan of care.       Precautions: OA      manual         Pa, ap mobs   kl kl         laser 4' 16W 4' 4' 4' 4'  JLW        Patella rom  MS kl kl JLW                                  Neuro Re-Ed                                                                                                        Ther Ex             Heel slides  2x10 5\" 5\"x20 5\"x20         Gastroc towel stretch with quad set  3x30\"   w/ strap  30\"x3        Quad set  W/ slr           SLR  flexion  x10 x20 x10         Slr abduction  2x10           Bridges  2x10 5\"x13          Prone TKE   5x20 5#  5\"x20         Cone taps   x20 x20 20 ea        Hip 3-way  15x ea x20ea x20 B/L  20 ea        bike  L2x6' L2x8 L2 8' 8'        Hr/tr   With quad set x20ea x20ea  20 ea        Leg press    B/L 30#x30  B/L   30#   30 ea                                  Ther Activity                                       Gait Training                                       Modalities                                              "

## 2025-07-16 ENCOUNTER — OFFICE VISIT (OUTPATIENT)
Dept: PHYSICAL THERAPY | Facility: CLINIC | Age: 55
End: 2025-07-16
Attending: STUDENT IN AN ORGANIZED HEALTH CARE EDUCATION/TRAINING PROGRAM
Payer: COMMERCIAL

## 2025-07-16 DIAGNOSIS — M17.11 PRIMARY OSTEOARTHRITIS OF RIGHT KNEE: Primary | ICD-10-CM

## 2025-07-16 PROCEDURE — 97110 THERAPEUTIC EXERCISES: CPT

## 2025-07-16 NOTE — PROGRESS NOTES
"Daily Note     Today's date: 2025  Patient name: Sandra Unger  : 1970  MRN: [id number]  Referring provider: Alan Worthington DO  Dx:   Encounter Diagnosis     ICD-10-CM    1. Primary osteoarthritis of right knee  M17.11           Start Time: 845  Stop Time: 928  Total time in clinic (min): 43 minutes    Subjective: Pt reports she was pretty sore for about a day following her LV, but is better today.        Objective: See treatment diary below      Assessment: Tolerated treatment well. Continued with program as outlined below.  Increased pain along patellar tendon with most activity today.  This pain did improve initially during bridges with TB resistance but then slowly began to return.  Also experienced tenderness along patellar tendon with light cross friction massage.  Patient would benefit from continued PT to further improve strength, decrease pain, and maximize overall function.        Plan: Continue per plan of care.      Precautions: OA      manual        Pa, ap mobs   kl kl         laser 4' 16W 4' 4' 4' 4'  JLW 4'  AFB       Patella rom  MS kl kl JLW AFB                                 Neuro Re-Ed                                                                                                        Ther Ex             Heel slides  2x10 5\" 5\"x20 5\"x20  5\"x10       Gastroc towel stretch with quad set  3x30\"   w/ strap  30\"x3 w/ strap  30\"x3       Quad set  W/ slr           SLR  flexion  x10 x20 x10  x10       Slr abduction  2x10           Bridges  2x10 5\"x13   5\"x5    GTB  5\"x8       Prone TKE   5x20 5#  5\"x20         Cone taps   x20 x20 20 ea 20 ea       Hip 3-way  15x ea x20ea x20 B/L  20 ea B/L  20 ea       bike  L2x6' L2x8 L2 8' 8' L2 8'       Hr/tr   With quad set x20ea x20ea  20 ea 20 ea       Leg press    B/L 30#x30  B/L   30#   30 ea B/L   30#   30 ea                                 Ther Activity                                       Gait Training           "                             Modalities

## 2025-07-21 ENCOUNTER — APPOINTMENT (OUTPATIENT)
Dept: PHYSICAL THERAPY | Facility: CLINIC | Age: 55
End: 2025-07-21
Attending: STUDENT IN AN ORGANIZED HEALTH CARE EDUCATION/TRAINING PROGRAM
Payer: COMMERCIAL

## 2025-07-25 ENCOUNTER — OFFICE VISIT (OUTPATIENT)
Dept: PHYSICAL THERAPY | Facility: CLINIC | Age: 55
End: 2025-07-25
Attending: STUDENT IN AN ORGANIZED HEALTH CARE EDUCATION/TRAINING PROGRAM
Payer: COMMERCIAL

## 2025-07-25 DIAGNOSIS — M17.11 PRIMARY OSTEOARTHRITIS OF RIGHT KNEE: Primary | ICD-10-CM

## 2025-07-25 PROCEDURE — 97112 NEUROMUSCULAR REEDUCATION: CPT

## 2025-07-25 PROCEDURE — 97110 THERAPEUTIC EXERCISES: CPT

## 2025-07-25 NOTE — PROGRESS NOTES
"Daily Note     Today's date: 2025  Patient name: Sandra Unger  : 1970  MRN: [id number]  Referring provider: Alan Worthington DO  Dx:   Encounter Diagnosis     ICD-10-CM    1. Primary osteoarthritis of right knee  M17.11                      Subjective: Patient states she is doing pretty well today.       Objective: See treatment diary below      Assessment: Tolerated treatment well. Continued with prescribed POC. Good response to laser. Patient demonstrated fatigue post treatment, exhibited good technique with therapeutic exercises, and would benefit from continued PT      Plan: Continue per plan of care.      Precautions: OA      manual       Pa, ap mobs   kl kl         laser 4' 16W 4' 4' 4' 4'  JLW 4'  AFB 4' RA      Patella rom  MS kl kl JLW AF[employer] RA                                Neuro Re-Ed                                                                                                        Ther Ex             Heel slides  2x10 5\" 5\"x20 5\"x20  5\"x10 5\" x 10       Gastroc towel stretch with quad set  3x30\"   w/ strap  30\"x3 w/ strap  30\"x3 W/ strap 30\"x 3       Quad set  W/ slr           SLR  flexion  x10 x20 x10  x10 X10       Slr abduction  2x10           Bridges  2x10 5\"x13   5\"x5    GTB  5\"x8 5\" x 10 GTB      Prone TKE   5x20 5#  5\"x20         Cone taps   x20 x20 20 ea 20 ea 20 ea      Hip 3-way  15x ea x20ea x20 B/L  20 ea B/L  20 ea B/l 20 ea       bike  L2x6' L2x8 L2 8' 8' L2 8' L2 8'       Hr/tr   With quad set x20ea x20ea  20 ea 20 ea 20 ea      Leg press    B/L 30#x30  B/L   30#   30 ea B/L   30#   30 ea B/l 30#  30 ea                                Ther Activity                                       Gait Training                                       Modalities                                                  "

## 2025-07-26 ENCOUNTER — OFFICE VISIT (OUTPATIENT)
Dept: URGENT CARE | Facility: CLINIC | Age: 55
End: 2025-07-26
Payer: COMMERCIAL

## 2025-07-26 VITALS
TEMPERATURE: 98.6 F | SYSTOLIC BLOOD PRESSURE: 146 MMHG | WEIGHT: 271 LBS | DIASTOLIC BLOOD PRESSURE: 83 MMHG | BODY MASS INDEX: 45.15 KG/M2 | OXYGEN SATURATION: 96 % | HEART RATE: 94 BPM | HEIGHT: 65 IN | RESPIRATION RATE: 18 BRPM

## 2025-07-26 DIAGNOSIS — H66.91 RIGHT OTITIS MEDIA, UNSPECIFIED OTITIS MEDIA TYPE: Primary | ICD-10-CM

## 2025-07-26 DIAGNOSIS — H61.21 IMPACTED CERUMEN OF RIGHT EAR: ICD-10-CM

## 2025-07-26 DIAGNOSIS — H60.331 ACUTE SWIMMER'S EAR OF RIGHT SIDE: ICD-10-CM

## 2025-07-26 PROCEDURE — 99213 OFFICE O/P EST LOW 20 MIN: CPT | Performed by: PHYSICIAN ASSISTANT

## 2025-07-26 PROCEDURE — 69209 REMOVE IMPACTED EAR WAX UNI: CPT | Performed by: PHYSICIAN ASSISTANT

## 2025-07-26 PROCEDURE — 69200 CLEAR OUTER EAR CANAL: CPT | Performed by: PHYSICIAN ASSISTANT

## 2025-07-26 RX ORDER — AMOXICILLIN 500 MG/1
500 CAPSULE ORAL EVERY 8 HOURS SCHEDULED
Qty: 30 CAPSULE | Refills: 0 | Status: SHIPPED | OUTPATIENT
Start: 2025-07-26 | End: 2025-08-05

## 2025-07-26 RX ORDER — NEOMYCIN SULFATE, POLYMYXIN B SULFATE AND HYDROCORTISONE 10; 3.5; 1 MG/ML; MG/ML; [USP'U]/ML
4 SUSPENSION/ DROPS AURICULAR (OTIC) 4 TIMES DAILY
Qty: 10 ML | Refills: 0 | Status: SHIPPED | OUTPATIENT
Start: 2025-07-26

## 2025-07-26 NOTE — PROGRESS NOTES
"[facility]        NAME: Sandra Unger is a 55 y.o. female  : 1970    MRN: [id number]  DATE: 2025  TIME: 10:59 AM    /83   Pulse 94   Temp 98.6 °F (37 °C) (Tympanic)   Resp 18   Ht 5' 5\" (1.651 m)   Wt 123 kg (271 lb)   SpO2 96%   BMI 45.10 kg/m²     Assessment and Plan   Right otitis media, unspecified otitis media type [H66.91]  1. Right otitis media, unspecified otitis media type  amoxicillin (AMOXIL) 500 mg capsule    neomycin-polymyxin-hydrocortisone (CORTISPORIN) 0.35%-10,000 units/mL-1% otic suspension      2. Impacted cerumen of right ear  amoxicillin (AMOXIL) 500 mg capsule    neomycin-polymyxin-hydrocortisone (CORTISPORIN) 0.35%-10,000 units/mL-1% otic suspension      3. Acute swimmer's ear of right side  amoxicillin (AMOXIL) 500 mg capsule    neomycin-polymyxin-hydrocortisone (CORTISPORIN) 0.35%-10,000 units/mL-1% otic suspension            Patient Instructions       Follow up with PCP in 3-5 days.  Proceed to  ER if symptoms worsen.    Chief Complaint     Chief Complaint   Patient presents with    Earache     Pain and blockage in right ear for 2 days.          History of Present Illness       Pt with right ear pain and some muffled hearing for several days     Earache   There is pain in the right ear. This is a new problem. The current episode started in the past 7 days. The problem occurs constantly. The problem has been gradually worsening. The maximum temperature recorded prior to her arrival was 100.4 - 100.9 F. The fever has been present for Less than 1 day. The pain is at a severity of 3/10.       Review of Systems   Review of Systems   Constitutional: Negative.    HENT:  Positive for ear pain.    Eyes: Negative.    Respiratory: Negative.     Cardiovascular: Negative.    Gastrointestinal: Negative.    Endocrine: Negative.    Genitourinary: Negative.    Musculoskeletal: Negative.    Skin: Negative.    Allergic/Immunologic: Negative.    Neurological: Negative.  " "  Hematological: Negative.    Psychiatric/Behavioral: Negative.     All other systems reviewed and are negative.        Current Medications     Current Medications[1]    Current Allergies     Allergies as of 07/26/2025 - Reviewed 07/26/2025   Allergen Reaction Noted    Codeine Anaphylaxis and Other (See Comments) 08/08/2005    Liraglutide Rash 08/15/2017            The following portions of the patient's history were reviewed and updated as appropriate: allergies, current medications, past family history, past medical history, past social history, past surgical history and problem list.     Past Medical History[2]    Past Surgical History[3]    Family History[4]      Medications have been verified.        Objective   /83   Pulse 94   Temp 98.6 °F (37 °C) (Tympanic)   Resp 18   Ht 5' 5\" (1.651 m)   Wt 123 kg (271 lb)   SpO2 96%   BMI 45.10 kg/m²        Physical Exam     Physical Exam  Vitals and nursing note reviewed.   Constitutional:       Appearance: Normal appearance. She is normal weight.   HENT:      Head: Normocephalic and atraumatic.      Right Ear: External ear normal.      Left Ear: Tympanic membrane, ear canal and external ear normal.      Ears:      Comments: Past lavage   tm erythema and canal swelling and erythema      Nose: Nose normal.      Mouth/Throat:      Mouth: Mucous membranes are moist.      Pharynx: Oropharynx is clear.     Eyes:      Extraocular Movements: Extraocular movements intact.      Pupils: Pupils are equal, round, and reactive to light.       Cardiovascular:      Rate and Rhythm: Normal rate and regular rhythm.      Pulses: Normal pulses.      Heart sounds: Normal heart sounds.   Pulmonary:      Effort: Pulmonary effort is normal.      Breath sounds: Normal breath sounds.   Abdominal:      Palpations: Abdomen is soft.     Musculoskeletal:         General: Normal range of motion.      Cervical back: Normal range of motion and neck supple.     Skin:     General: Skin is " warm.     Neurological:      Mental Status: She is alert and oriented to person, place, and time.           Universal Protocol:  Consent: Verbal consent obtained. Written consent not obtained  Consent given by: patient  Foreign body removal    Date/Time: 7/26/2025 10:30 AM    Performed by: Luis Carlos Herndon Jr., PA-C  Authorized by: Luis Carlos Herndon Jr., PA-C  Body area: ear    Sedation:  Patient sedated: no  Patient restrained: no  Patient cooperative: yes  Localization method: visualized  Removal mechanism: irrigation  Complexity: simple  1 objects recovered.  Objects recovered: cerumen  Post-procedure assessment: foreign body removed  Patient tolerance: patient tolerated the procedure well with no immediate complications                       [1]   Current Outpatient Medications:     amoxicillin (AMOXIL) 500 mg capsule, Take 1 capsule (500 mg total) by mouth every 8 (eight) hours for 10 days, Disp: 30 capsule, Rfl: 0    celecoxib (CeleBREX) 100 mg capsule, Take 1 capsule (100 mg total) by mouth 2 (two) times a day, Disp: 60 capsule, Rfl: 1    cetirizine (ZyrTEC) 5 MG chewable tablet, Chew 5 mg in the morning., Disp: , Rfl:     Diclofenac Sodium (VOLTAREN) 1 %, Apply 2 g topically 4 (four) times a day, Disp: 100 g, Rfl: 0    neomycin-polymyxin-hydrocortisone (CORTISPORIN) 0.35%-10,000 units/mL-1% otic suspension, Administer 4 drops to the right ear 4 (four) times a day, Disp: 10 mL, Rfl: 0    NovoLOG 100 UNIT/ML injection, , Disp: , Rfl:     rosuvastatin (CRESTOR) 20 MG tablet, Take 20 mg by mouth in the morning., Disp: , Rfl:     valsartan (DIOVAN) 40 mg tablet, Take 40 mg by mouth in the morning., Disp: , Rfl:     anastrozole (ARIMIDEX) 1 mg tablet, Take 1 mg by mouth daily, Disp: , Rfl:     ibuprofen (MOTRIN) 200 mg tablet, Take 600 mg by mouth every 6 (six) hours as needed for mild pain, Disp: , Rfl:   [2]   Past Medical History:  Diagnosis Date    Breast cancer (HCC)     Depression     Diabetes mellitus (HCC)      DM (diabetes mellitus) (HCC)     High cholesterol     HTN (hypertension)     Hypertension    [3]   Past Surgical History:  Procedure Laterality Date    BREAST LUMPECTOMY Left     CHOLECYSTECTOMY      SMALL INTESTINE SURGERY      surgery as an infant    TONSILLECTOMY      US BREAST CLIP NEEDLE LOC LEFT Left 2024   [4]   Family History  Problem Relation Name Age of Onset    Cancer Mother Sandra Saldana     Cancer Father Coleman

## 2025-07-29 ENCOUNTER — OFFICE VISIT (OUTPATIENT)
Dept: PHYSICAL THERAPY | Facility: CLINIC | Age: 55
End: 2025-07-29
Attending: STUDENT IN AN ORGANIZED HEALTH CARE EDUCATION/TRAINING PROGRAM
Payer: COMMERCIAL

## 2025-07-29 DIAGNOSIS — M17.11 PRIMARY OSTEOARTHRITIS OF RIGHT KNEE: Primary | ICD-10-CM

## 2025-07-29 PROCEDURE — 97140 MANUAL THERAPY 1/> REGIONS: CPT | Performed by: PHYSICAL THERAPIST

## 2025-07-29 PROCEDURE — 97110 THERAPEUTIC EXERCISES: CPT | Performed by: PHYSICAL THERAPIST

## 2025-07-30 DIAGNOSIS — M17.11 PRIMARY OSTEOARTHRITIS OF RIGHT KNEE: ICD-10-CM

## 2025-07-31 ENCOUNTER — OFFICE VISIT (OUTPATIENT)
Dept: PHYSICAL THERAPY | Facility: CLINIC | Age: 55
End: 2025-07-31
Attending: STUDENT IN AN ORGANIZED HEALTH CARE EDUCATION/TRAINING PROGRAM
Payer: COMMERCIAL

## 2025-07-31 DIAGNOSIS — M17.11 PRIMARY OSTEOARTHRITIS OF RIGHT KNEE: Primary | ICD-10-CM

## 2025-07-31 PROCEDURE — 97110 THERAPEUTIC EXERCISES: CPT

## 2025-07-31 PROCEDURE — 97112 NEUROMUSCULAR REEDUCATION: CPT

## 2025-07-31 RX ORDER — CELECOXIB 100 MG/1
100 CAPSULE ORAL 2 TIMES DAILY
Qty: 60 CAPSULE | Refills: 1 | Status: SHIPPED | OUTPATIENT
Start: 2025-07-31

## 2025-08-04 ENCOUNTER — OFFICE VISIT (OUTPATIENT)
Dept: PHYSICAL THERAPY | Facility: CLINIC | Age: 55
End: 2025-08-04
Attending: STUDENT IN AN ORGANIZED HEALTH CARE EDUCATION/TRAINING PROGRAM
Payer: COMMERCIAL

## 2025-08-04 DIAGNOSIS — M17.11 PRIMARY OSTEOARTHRITIS OF RIGHT KNEE: Primary | ICD-10-CM

## 2025-08-04 PROCEDURE — 97110 THERAPEUTIC EXERCISES: CPT | Performed by: PHYSICAL THERAPIST

## 2025-08-04 PROCEDURE — 97140 MANUAL THERAPY 1/> REGIONS: CPT | Performed by: PHYSICAL THERAPIST

## 2025-08-07 ENCOUNTER — OFFICE VISIT (OUTPATIENT)
Dept: PHYSICAL THERAPY | Facility: CLINIC | Age: 55
End: 2025-08-07
Attending: STUDENT IN AN ORGANIZED HEALTH CARE EDUCATION/TRAINING PROGRAM
Payer: COMMERCIAL

## 2025-08-07 DIAGNOSIS — M17.11 PRIMARY OSTEOARTHRITIS OF RIGHT KNEE: Primary | ICD-10-CM

## 2025-08-07 PROCEDURE — 97140 MANUAL THERAPY 1/> REGIONS: CPT | Performed by: PHYSICAL THERAPIST

## 2025-08-07 PROCEDURE — 97530 THERAPEUTIC ACTIVITIES: CPT | Performed by: PHYSICAL THERAPIST

## 2025-08-07 PROCEDURE — 97110 THERAPEUTIC EXERCISES: CPT | Performed by: PHYSICAL THERAPIST

## 2025-08-11 ENCOUNTER — OFFICE VISIT (OUTPATIENT)
Dept: PHYSICAL THERAPY | Facility: CLINIC | Age: 55
End: 2025-08-11
Attending: STUDENT IN AN ORGANIZED HEALTH CARE EDUCATION/TRAINING PROGRAM
Payer: COMMERCIAL

## 2025-08-13 ENCOUNTER — OFFICE VISIT (OUTPATIENT)
Dept: PHYSICAL THERAPY | Facility: CLINIC | Age: 55
End: 2025-08-13
Attending: STUDENT IN AN ORGANIZED HEALTH CARE EDUCATION/TRAINING PROGRAM
Payer: COMMERCIAL

## 2025-08-19 ENCOUNTER — OFFICE VISIT (OUTPATIENT)
Dept: PHYSICAL THERAPY | Facility: CLINIC | Age: 55
End: 2025-08-19
Attending: STUDENT IN AN ORGANIZED HEALTH CARE EDUCATION/TRAINING PROGRAM
Payer: COMMERCIAL

## 2025-08-19 DIAGNOSIS — M17.11 PRIMARY OSTEOARTHRITIS OF RIGHT KNEE: Primary | ICD-10-CM

## 2025-08-19 PROCEDURE — 97110 THERAPEUTIC EXERCISES: CPT | Performed by: PHYSICAL THERAPIST

## 2025-08-19 PROCEDURE — 97140 MANUAL THERAPY 1/> REGIONS: CPT | Performed by: PHYSICAL THERAPIST
